# Patient Record
Sex: MALE | Race: OTHER | NOT HISPANIC OR LATINO | Employment: UNEMPLOYED | URBAN - METROPOLITAN AREA
[De-identification: names, ages, dates, MRNs, and addresses within clinical notes are randomized per-mention and may not be internally consistent; named-entity substitution may affect disease eponyms.]

---

## 2024-05-27 DIAGNOSIS — M54.2 NECK PAIN: ICD-10-CM

## 2024-05-27 DIAGNOSIS — M47.22 CERVICAL SPONDYLOSIS WITH RADICULOPATHY: ICD-10-CM

## 2024-05-27 DIAGNOSIS — M54.12 CERVICAL RADICULOPATHY: Primary | ICD-10-CM

## 2024-05-28 ENCOUNTER — CLINICAL SUPPORT (OUTPATIENT)
Dept: REHABILITATION | Facility: HOSPITAL | Age: 49
End: 2024-05-28
Payer: MEDICAID

## 2024-05-28 DIAGNOSIS — Z74.09 IMPAIRED FUNCTIONAL MOBILITY AND ACTIVITY TOLERANCE: Primary | ICD-10-CM

## 2024-05-28 DIAGNOSIS — M54.12 CERVICAL RADICULOPATHY: ICD-10-CM

## 2024-05-28 PROCEDURE — 97162 PT EVAL MOD COMPLEX 30 MIN: CPT

## 2024-05-28 PROCEDURE — 97110 THERAPEUTIC EXERCISES: CPT

## 2024-05-28 NOTE — PROGRESS NOTES
[facility]   Physical Therapy Initial Evaluation     Date: 5/28/2024   Name: Toro Quijano Jr.  Clinic Number: [id number]    Therapy Diagnosis:   Encounter Diagnoses   Name Primary?    Cervical radiculopathy     Impaired functional mobility and activity tolerance Yes     Physician: Mary Kate Leblanc FNP    Physician Orders: PT Eval and Treat  Medical Diagnosis from Referral: Cervical Radiculopathy, Cervical Spondylosis with Radiculopathy, Degenerative Disc Disease, Neck Pain  Evaluation Date: 5/28/2024  Authorization Period Expiration: determined by insurance  Plan of Care Expiration: 8/20/2024  Visit # / Visits authorized: determined by insurance    Time In: 0851  Time Out: 0956  Total Appointment Time (timed & untimed codes): 30 minutes  Total Treatment time (time-based codes) separate from Evaluation: 35 minutes    Surgery: none  Orthopedic Precautions: none  Pertinent History: see medical chart    SUBJECTIVE   Toro reports: neck and bilateral arm pain for years that has recently began to worsen. States he was told that he needed neck surgery years ago and but has not wanted to do it. States that his pain is constant mostly right neck but is on both sides. He describes his pain as aching, dull that can be sharp at times if does the wrong thing. There is radicular pain that is also mostly right but there is left involvement as well. He sleeps ok but does wake up from the pain getting about 5 hours sleep per night. He has to sleep on his left side and back, right side makes him hurt. He is limited in activities as the more he does the more he will hurt. States that his right arm is weak and that he is not able to lift much. He is limited with ADL's and functional capacity. He goal is to get rid of the pain so that he can do more and sleep better.      Imaging, EXAM: CT C-spine without Contrast   Degenerative change of the cervical spine with no evidence of acute traumatic injury      DATE: 3/18/2024 8:49 AM     COMPARISON: None     INDICATION: 48 years Male with Cervical radiculopathy,  no red flags     TECHNIQUE: Contiguous axial CT images of the cervical spine were obtained without administration of IV contrast.  All CTs performed at this institution utilize dose modulation and/or weight-based dose reduction when appropriate to reduce radiation dose to the patient as low as reasonably achievable     Count of known CTs or cardiac nuclear scans in the previous 12 months: 1     FINDINGS: There is no fracture visualized.  The vertebral body alignment is maintained. Multilevel degenerative change of the cervical spine is present with disc space obliteration, osteophytosis, facet and uncovertebral arthropathy, as well as multifocal neuroforaminal narrowing. There is kyphotic reversal of the cervical lordosis. The soft tissues are unremarkable. The visualized lung apices are clear. There is mucosal thickening in the ethmoid and sphenoid sinuses        Medical History:   No past medical history on file.    Surgical History:   Toro Quijano Jr.  has no past surgical history on file.    Medications:   Toro currently has no medications in their medication list.    Allergies:   Review of patient's allergies indicates:  Not on File       Outcome Measure:  Therapist reviewed FOTO scores for Toro Quijano on 5/28/2024. FOTO documents entered into [employer] - see Media section.  FOTO filled out by: Patient     Intake: Patient's Physical FS Primary Measure: 37  (goal 50 by visit 13)  Intake: Risk Adjusted Statistical FOTO: 51   Intake: Limitation Score: 52%  Intake: Category: Carrying  Intake: NDI: 58% (higher score =greater disability)    OBJECTIVE       Posture     Rounded shoulders, forward head, mild to mod kyphosis, slight right head tilt      Palpation     Tender to moderate palpatory pressure right cervical paraspinals, rhomboid major and minor, infraspinatus and levator scap; left cervical paraspinals,  spinous process T4-T6     Dermatomes  BUEs intact to light touch       MMT     FLX 5/5 left, 4/5 right  EXT 5/5 reft, 4/5 right  SCAPTION 4+/5 left, 4/5 right  ABD 4-/5 left, 4/5 right  ER 4/5 left, 4/5 right  IR 4/5 left, 5/5 right  ELBOW FLX 5/5 left, 4+/5 right  ELBOW EXT 5/5 left 4/5 right  PRONATION 5/5 left, 5/5 right  SUPINATION 5/5 left, 5/5 right   5/5 left, 4/5 right      Shoulder Clearance     AROM: basically complete to both shoulders with slight limitation on the right when compared to left     Right - Self Walt Test: negative              Neer Impingement: negative, shoulder abduction weakened     Left - Self-Walt negative            Neer Impingement negative         AROM     Cervical Spine  Flexion -25% limited with pulling and pain on the right cervical and upper trap  Extension - 25% limited, produced right posterior cervical and periscapular pain  Lateral Flexion left - 75% limited pain on the right  Lateral Flexion right - 60% limited pain on the left  Rotation left -45% limited pain on left  Rotation right -60% limited pain on right      **positive numbness and tingling produced during testing      Passive Accessory     Central PAs - mild pain cervical spine, tenderness T3-T6  Unilateral PAs - pain cervical spine right side;  tenderness T4-T7 right      Special Tests     Flexion-rotation (cervicogenic HA): (negative)left, (negative)right  Repeated Motions:   -Flexion negative but pain in cervical spine   -Extension negative but pain in cervical spine   -Left rotation - limited pain on right   -Right rotation - limited pain on right   -Left lateral flexion - mild right neck pain with numbness or tingling      produced right UE   -Right lateral flexion -mild to moderate right neck pain with numbness and  tingling produced right UE  Distraction: Positive, relieves pain symptoms  Spurling's: positive; numbness or tingling produced right; no numbness or tingling produced  left  Compression: positive; pain with numbness or tingling produced R UE               TREATMENT     Toro received the treatments listed below:       Time Activities   Manual 15 Manual cervical traction, distraction, mobs; thoracic PA's   TherAct     TherEx 20 HEP; chin tucks, levator scap stretch, upper trap stretch, horizontal abd, rows   Gait     Neuro Re-ed     Modalities     E-Stim     Dry Needling     Canalith Repositioning         Home Exercises and Patient Education Provided    Education provided:   - HEP, plan of care, postural instruction     Verbal Home Exercises Provided: yes.  Exercises were reviewed on the handout given and Toro was able to demonstrate them prior to the end of the sessionToro demonstrated good understanding of the education provided.    See EMR under Patient Instructions for exercises provided 5/28/2024.    ASSESSMENT     Toro is a 48 y.o. male referred to outpatient Physical Therapy with a medical diagnosis of  Cercvical Radiculopathy, Cervical Spondylosis with Radiculopathy, Degenerative Disc Disease, Patient presents with pain, weakness, limited cervical mobility radicular pain with numbness and tingling into both upper extremities R>L. Patient will benefit from skilled outpatient Physical Therapy to address the deficits stated above and in the chart below, provide education, and to maximize patient's level of independence.     Patient prognosis is Good.     Plan of care discussed with patient: Yes  Patient's spiritual, cultural and educational needs considered and patient is agreeable to the plan of care and goals as stated below:     Anticipated Barriers for therapy: none    Goals:  Short Term Goals: 6 weeks   Patient will report at least 10% increase in functional capacity from initial QuickDash score to indicate clinically significant functional improvement  Patient will report at least 10 point increase on initial FOTO score to indicate clinically significant functional  improvement  Patient will demonstrate independence with home exercise program  Patient will report decreased pain levels by 20%  Patient will improved ability to perform all ADL's at home and report 3/10 pain or less  Patient will demonstrate improved posture with return to sleeping in bed        Long Term Goals: 12 weeks   Patient will report at least 20% increase in functional capacity from initial QuickDash score to indicate clinically significant functional improvement  Patient will report at least 20 point increase on initial FOTO score to indicate clinically significant functional improvement  Patient will report at least 60% overall perceived improvements since start of care.  Patient will demonstrate safe functional activity tolerance and return to PLOF      PLAN   Plan of care Certification: 5/28/2024 to 8/20/2024.    Outpatient Physical Therapy 3 times weekly for 12 weeks to include the following interventions: Cervical/Lumbar Traction, Manual Therapy, Dry Needling, Moist Heat/ Ice, Neuromuscular Re-ed, Patient Education, Self Care, Therapeutic Activities, and Therapeutic Exercise.     Jeet Ying, PT

## 2024-05-30 NOTE — PLAN OF CARE
[facility]   Physical Therapy Initial Evaluation     Date: 5/28/2024   Name: Toro Quijano Jr.  Clinic Number: [id number]    Therapy Diagnosis:   Encounter Diagnoses   Name Primary?    Cervical radiculopathy     Impaired functional mobility and activity tolerance Yes     Physician: Mary Kate Leblanc FNP    Physician Orders: PT Eval and Treat  Medical Diagnosis from Referral: Cervical Radiculopathy, Cervical Spondylosis with Radiculopathy, Degenerative Disc Disease, Neck Pain  Evaluation Date: 5/28/2024  Authorization Period Expiration: determined by insurance  Plan of Care Expiration: 8/20/2024  Visit # / Visits authorized: determined by insurance    Time In: 0851  Time Out: 0956  Total Appointment Time (timed & untimed codes): 30 minutes  Total Treatment time (time-based codes) separate from Evaluation: 35 minutes    Surgery: none  Orthopedic Precautions: none  Pertinent History: see medical chart    SUBJECTIVE   Toro reports: neck and bilateral arm pain for years that has recently began to worsen. States he was told that he needed neck surgery years ago and but has not wanted to do it. States that his pain is constant mostly right neck but is on both sides. He describes his pain as aching, dull that can be sharp at times if does the wrong thing. There is radicular pain that is also mostly right but there is left involvement as well. He sleeps ok but does wake up from the pain getting about 5 hours sleep per night. He has to sleep on his left side and back, right side makes him hurt. He is limited in activities as the more he does the more he will hurt. States that his right arm is weak and that he is not able to lift much. He is limited with ADL's and functional capacity. He goal is to get rid of the pain so that he can do more and sleep better.      Imaging, EXAM: CT C-spine without Contrast   Degenerative change of the cervical spine with no evidence of acute traumatic injury      DATE: 3/18/2024 8:49 AM     COMPARISON: None     INDICATION: 48 years Male with Cervical radiculopathy,  no red flags     TECHNIQUE: Contiguous axial CT images of the cervical spine were obtained without administration of IV contrast.  All CTs performed at this institution utilize dose modulation and/or weight-based dose reduction when appropriate to reduce radiation dose to the patient as low as reasonably achievable     Count of known CTs or cardiac nuclear scans in the previous 12 months: 1     FINDINGS: There is no fracture visualized.  The vertebral body alignment is maintained. Multilevel degenerative change of the cervical spine is present with disc space obliteration, osteophytosis, facet and uncovertebral arthropathy, as well as multifocal neuroforaminal narrowing. There is kyphotic reversal of the cervical lordosis. The soft tissues are unremarkable. The visualized lung apices are clear. There is mucosal thickening in the ethmoid and sphenoid sinuses        Medical History:   No past medical history on file.    Surgical History:   Toro Quijano Jr.  has no past surgical history on file.    Medications:   Toro currently has no medications in their medication list.    Allergies:   Review of patient's allergies indicates:  Not on File       Outcome Measure:  Therapist reviewed FOTO scores for Toro Quijano on 5/28/2024. FOTO documents entered into [employer] - see Media section.  FOTO filled out by: Patient     Intake: Patient's Physical FS Primary Measure: 37  (goal 50 by visit 13)  Intake: Risk Adjusted Statistical FOTO: 51   Intake: Limitation Score: 52%  Intake: Category: Carrying  Intake: NDI: 58% (higher score =greater disability)    OBJECTIVE       Posture     Rounded shoulders, forward head, mild to mod kyphosis, slight right head tilt      Palpation     Tender to moderate palpatory pressure right cervical paraspinals, rhomboid major and minor, infraspinatus and levator scap; left cervical paraspinals;  spinous process T4-T6 tender     Dermatomes  BUEs intact to light touch       MMT     FLX 5/5 left, 4/5 right  EXT 5/5 reft, 4/5 right  SCAPTION 4+/5 left, 4/5 right  ABD 4-/5 left, 4/5 right  ER 4/5 left, 4/5 right  IR 4/5 left, 5/5 right  ELBOW FLX 5/5 left, 4+/5 right  ELBOW EXT 5/5 left 4/5 right  PRONATION 5/5 left, 5/5 right  SUPINATION 5/5 left, 5/5 right   5/5 left, 4/5 right      Shoulder Clearance     AROM: basically complete to both shoulders with slight limitation on the right when compared to left     Right - Self Walt Test: negative              Neer Impingement: negative, shoulder abduction weakened     Left - Self-Walt negative            Neer Impingement negative         AROM     Cervical Spine  Flexion -25% limited with pulling and pain on the right cervical and upper trap  Extension - 25% limited, produced right posterior cervical and periscapular pain  Lateral Flexion left - 75% limited pain on the right  Lateral Flexion right - 60% limited pain on the left  Rotation left -45% limited pain on left  Rotation right -60% limited pain on right      **positive numbness and tingling produced during testing      Passive Accessory     Central PAs - mild pain cervical spine, tenderness T3-T6  Unilateral PAs - pain cervical spine right side;  tenderness T4-T7 right      Special Tests     Flexion-rotation (cervicogenic HA): (negative)left, (negative)right  Repeated Motions:   -Flexion negative but pain in cervical spine   -Extension negative but pain in cervical spine   -Left rotation - limited pain on right   -Right rotation - limited pain on right   -Left lateral flexion - mild right neck pain with numbness or tingling      produced right UE   -Right lateral flexion -mild to moderate right neck pain with numbness and  tingling produced right UE  Distraction: Positive, relieves pain symptoms  Spurling's: positive; numbness or tingling produced right; no numbness or tingling produced  left  Compression: positive; pain with numbness or tingling produced R UE               TREATMENT     Toro received the treatments listed below:       Time Activities   Manual 15 Manual cervical traction, distraction, mobs; thoracic PA's   TherAct     TherEx 20 HEP; chin tucks, levator scap stretch, upper trap stretch, horizontal abd, rows   Gait     Neuro Re-ed     Modalities     E-Stim     Dry Needling     Canalith Repositioning         Home Exercises and Patient Education Provided    Education provided:   - HEP, plan of care, postural instruction     Verbal Home Exercises Provided: yes.  Exercises were reviewed on the handout given and Toro was able to demonstrate them prior to the end of the sessionToro demonstrated good understanding of the education provided.    See EMR under Patient Instructions for exercises provided 5/28/2024.    ASSESSMENT     Toro is a 48 y.o. male referred to outpatient Physical Therapy with a medical diagnosis of  Cercvical Radiculopathy, Cervical Spondylosis with Radiculopathy, Degenerative Disc Disease, Patient presents with pain, weakness, limited cervical mobility radicular pain with numbness and tingling into both upper extremities R>L. Patient will benefit from skilled outpatient Physical Therapy to address the deficits stated above and in the chart below, provide education, and to maximize patient's level of independence.     Patient prognosis is Good.     Plan of care discussed with patient: Yes  Patient's spiritual, cultural and educational needs considered and patient is agreeable to the plan of care and goals as stated below:     Anticipated Barriers for therapy: none    Goals:  Short Term Goals: 6 weeks   Patient will report at least 10% increase in functional capacity from initial QuickDash score to indicate clinically significant functional improvement  Patient will report at least 10 point increase on initial FOTO score to indicate clinically significant functional  improvement  Patient will demonstrate independence with home exercise program  Patient will report decreased pain levels by 20%  Patient will improved ability to perform all ADL's at home and report 3/10 pain or less  Patient will demonstrate improved posture with return to sleeping in bed        Long Term Goals: 12 weeks   Patient will report at least 20% increase in functional capacity from initial QuickDash score to indicate clinically significant functional improvement  Patient will report at least 20 point increase on initial FOTO score to indicate clinically significant functional improvement  Patient will report at least 60% overall perceived improvements since start of care.  Patient will demonstrate safe functional activity tolerance and return to PLOF      PLAN   Plan of care Certification: 5/28/2024 to 8/20/2024.    Outpatient Physical Therapy 3 times weekly for 12 weeks to include the following interventions: Cervical/Lumbar Traction, Manual Therapy, Dry Needling, Moist Heat/ Ice, Neuromuscular Re-ed, Patient Education, Self Care, Therapeutic Activities, and Therapeutic Exercise.     Jeet Ying, PT

## 2024-05-31 ENCOUNTER — CLINICAL SUPPORT (OUTPATIENT)
Dept: REHABILITATION | Facility: HOSPITAL | Age: 49
End: 2024-05-31
Payer: MEDICAID

## 2024-05-31 DIAGNOSIS — Z74.09 IMPAIRED FUNCTIONAL MOBILITY AND ACTIVITY TOLERANCE: ICD-10-CM

## 2024-05-31 DIAGNOSIS — M54.12 CERVICAL RADICULOPATHY: Primary | ICD-10-CM

## 2024-05-31 PROCEDURE — 97012 MECHANICAL TRACTION THERAPY: CPT

## 2024-05-31 PROCEDURE — 97140 MANUAL THERAPY 1/> REGIONS: CPT

## 2024-05-31 PROCEDURE — 97110 THERAPEUTIC EXERCISES: CPT

## 2024-05-31 NOTE — PROGRESS NOTES
Physical Therapy Treatment Note     Name: Toro Quijano Jr.  Clinic Number: [id number]    Therapy Diagnosis: No diagnosis found.  Physician: Mary Kate Leblanc FNP    Visit Date: 5/31/2024    Physician Orders: PT Eval and Treat  Medical Diagnosis from Referral: Cervical Radiculopathy, Cervical Spondylosis with Radiculopathy, Degenerative Disc Disease, Neck Pain  Evaluation Date: 5/28/2024  Authorization Period Expiration: determined by insurance  Plan of Care Expiration: 8/20/2024  Visit #/Visits authorized: visit #1    Time In: 0747  Time Out: 0900  Total Billable Time: 63 minutes    Surgery: none  Orthopedic Precautions: none  Pertinent History: see medical chart    Subjective     Patient reports: that he is having some neck pain with mild arm pain. Mostly on the R side. Does have a small HA.    Response to previous treatment: good    Therapist reviewed FOTO scores for Toro Quijano on 5/28/2024. FOTO documents entered into [employer] - see Media section.  FOTO filled out by: Patient     Intake: Patient's Physical FS Primary Measure: 37  (goal 50 by visit 13)  Intake: Risk Adjusted Statistical FOTO: 51   Intake: Limitation Score: 52%  Intake: Category: Carrying  Intake: NDI: 58% (higher score =greater disability)    Objective     Toro received the following treatment:     Time Activities   Manual 23 passive stretch levator, upper traps, posterior scalenes, suboccipital release, lateral cervical glides, right gapping mobilizations, passive cervical rotation end range with stretch, Biofreeze to posterior neck and upper traps   TherAct     TherEx 20 isometric cervical, chin tucks, levator scap stretch, upper trap stretch, banded horizontal abd, banded bilat ER   Gait     Neuro Re-ed     Modalities 10 CP post exercise   E-Stim     Dry Needling     Mechanical cervical traction 20 30/15#; 30/10 sec cycle, 6:6 ramp         Home Exercises Provided and Patient Education Provided     - HEP, plan of care, postural instruction      Verbal Home Exercises Provided: yes.  Exercises were reviewed on the handout given and Toro was able to demonstrate them prior to the end of the sessionToro demonstrated good understanding of the education provided.     See EMR under Patient Instructions for exercises provided 5/28/2024.    Assessment     Toro is a 48 y.o. male referred to outpatient Physical Therapy with a medical diagnosis of Cercvical Radiculopathy, Cervical Spondylosis with Radiculopathy, Degenerative Disc Disease, Patient presents with pain, weakness, limited cervical mobility radicular pain with numbness and tingling into both upper extremities R>L.     Toro received mechanical cervical traction and he did well for about 15 minutes but then began to have a HA and it was discontinued. He was receiving relief initially. We may decrease the length of time and see if helpful, if not will discontinue. Had him perform both cervical and scapula stabilization exercises and he tolerated it ok. Did some gentle manual stretching and everything to the right seem to increase his discomfort. We will continue with cervical spine intervention to allow stability and minimize radicular symptoms.    Patient prognosis is Good.      Anticipated barriers to physical therapy: none    Goals: Toro Is progressing well towards his goals.    Short Term Goals: 6 weeks   Patient will report at least 10% increase in functional capacity from initial QuickDash score to indicate clinically significant functional improvement  Patient will report at least 10 point increase on initial FOTO score to indicate clinically significant functional improvement  Patient will demonstrate independence with home exercise program  Patient will report decreased pain levels by 20%  Patient will improved ability to perform all ADL's at home and report 3/10 pain or less  Patient will demonstrate improved posture with return to sleeping in bed      Long Term Goals: 12 weeks   Patient will  report at least 20% increase in functional capacity from initial QuickDash score to indicate clinically significant functional improvement  Patient will report at least 20 point increase on initial FOTO score to indicate clinically significant functional improvement  Patient will report at least 60% overall perceived improvements since start of care.  Patient will demonstrate safe functional activity tolerance and return to PLOF    Plan     Outpatient Physical Therapy 3 times weekly for 12 weeks to include the following interventions: Cervical/Lumbar Traction, Manual Therapy, Dry Needling, Moist Heat/ Ice, Neuromuscular Re-ed, Patient Education, Self Care, Therapeutic Activities, and Therapeutic Exercise.     Jete Ying, PT ;ly

## 2024-06-04 ENCOUNTER — CLINICAL SUPPORT (OUTPATIENT)
Dept: REHABILITATION | Facility: HOSPITAL | Age: 49
End: 2024-06-04
Payer: MEDICAID

## 2024-06-04 DIAGNOSIS — M54.12 CERVICAL RADICULOPATHY: Primary | ICD-10-CM

## 2024-06-04 DIAGNOSIS — Z74.09 IMPAIRED FUNCTIONAL MOBILITY AND ACTIVITY TOLERANCE: ICD-10-CM

## 2024-06-04 PROCEDURE — 97140 MANUAL THERAPY 1/> REGIONS: CPT

## 2024-06-04 PROCEDURE — 97012 MECHANICAL TRACTION THERAPY: CPT

## 2024-06-04 PROCEDURE — 97110 THERAPEUTIC EXERCISES: CPT

## 2024-06-04 NOTE — PROGRESS NOTES
Physical Therapy Treatment Note     Name: Toro Quijano  Clinic Number: [id number]    Therapy Diagnosis: No diagnosis found.  Physician: Mary Kate Leblanc FNP    Visit Date: 6/4/2024    Physician Orders: PT Eval and Treat  Medical Diagnosis from Referral: Cervical Radiculopathy, Cervical Spondylosis with Radiculopathy, Degenerative Disc Disease, Neck Pain  Evaluation Date: 5/28/2024  Authorization Period Expiration: determined by insurance  Plan of Care Expiration: 8/20/2024  Visit #/Visits authorized: visit #2    Time In: 0916  Time Out:1015   Total Billable Time: 59 minutes    Surgery: none  Orthopedic Precautions: none  Pertinent History: see medical chart    Subjective     Patient reports: that he is having some neck pain with mild arm pain. Mostly on the R side. Does have a small HA.    Response to previous treatment: good    Therapist reviewed FOTO scores for Toro Quijano on 5/28/2024. FOTO documents entered into EPIC - see Media section.  FOTO filled out by: Patient     Intake: Patient's Physical FS Primary Measure: 37  (goal 50 by visit 13)  Intake: Risk Adjusted Statistical FOTO: 51   Intake: Limitation Score: 52%  Intake: Category: Carrying  Intake: NDI: 58% (higher score =greater disability)    Objective     Toro received the following treatment:     Time Activities   Manual 23 passive stretch levator, upper traps, posterior scalenes, suboccipital release, lateral cervical glides, right gapping mobilizations, passive cervical rotation end range with stretch, Biofreeze to posterior neck and upper traps   TherAct     TherEx 20 isometric cervical, chin tucks, levator scap stretch, upper trap stretch, banded horizontal abd, banded bilat ER   Gait     Neuro Re-ed     Modalities 10 CP post exercise   E-Stim     Dry Needling     Mechanical cervical traction 12 25/15#; 30/10 sec cycle, 6:6 ramp         Home Exercises Provided and Patient Education Provided     - HEP, plan of care, postural instruction      Verbal Home Exercises Provided: yes.  Exercises were reviewed on the handout given and Toro was able to demonstrate them prior to the end of the sessionToro demonstrated good understanding of the education provided.     See EMR under Patient Instructions for exercises provided 5/28/2024.    Assessment     Toro is a 48 y.o. male referred to outpatient Physical Therapy with a medical diagnosis of Cercvical Radiculopathy, Cervical Spondylosis with Radiculopathy, Degenerative Disc Disease, Patient presents with pain, weakness, limited cervical mobility radicular pain with numbness and tingling into both upper extremities R>L.     Toro received mechanical cervical traction and he did well for about 12 minutes but then began to have a HA. He was able to last a little longer than last time but still caused the HA so we will plan to discontinue. Had him perform both cervical and scapula stabilization exercises and he tolerated it ok. Did some gentle manual stretching and everything to the right seem to increase his discomfort but had better tolerance today. We will continue with cervical spine intervention to allow stability and minimize radicular symptoms.    Patient prognosis is Good.      Anticipated barriers to physical therapy: none    Goals: Toro Is progressing well towards his goals.    Short Term Goals: 6 weeks   Patient will report at least 10% increase in functional capacity from initial QuickDash score to indicate clinically significant functional improvement  Patient will report at least 10 point increase on initial FOTO score to indicate clinically significant functional improvement  Patient will demonstrate independence with home exercise program  Patient will report decreased pain levels by 20%  Patient will improved ability to perform all ADL's at home and report 3/10 pain or less  Patient will demonstrate improved posture with return to sleeping in bed      Long Term Goals: 12 weeks   Patient will  report at least 20% increase in functional capacity from initial QuickDash score to indicate clinically significant functional improvement  Patient will report at least 20 point increase on initial FOTO score to indicate clinically significant functional improvement  Patient will report at least 60% overall perceived improvements since start of care.  Patient will demonstrate safe functional activity tolerance and return to PLOF    Plan     Outpatient Physical Therapy 3 times weekly for 12 weeks to include the following interventions: Cervical/Lumbar Traction, Manual Therapy, Dry Needling, Moist Heat/ Ice, Neuromuscular Re-ed, Patient Education, Self Care, Therapeutic Activities, and Therapeutic Exercise.     Jeet Ying, PT ;ly

## 2024-06-07 ENCOUNTER — CLINICAL SUPPORT (OUTPATIENT)
Dept: REHABILITATION | Facility: HOSPITAL | Age: 49
End: 2024-06-07
Payer: MEDICAID

## 2024-06-07 DIAGNOSIS — Z74.09 IMPAIRED FUNCTIONAL MOBILITY AND ACTIVITY TOLERANCE: ICD-10-CM

## 2024-06-07 DIAGNOSIS — M54.12 CERVICAL RADICULOPATHY: Primary | ICD-10-CM

## 2024-06-07 PROCEDURE — 97140 MANUAL THERAPY 1/> REGIONS: CPT

## 2024-06-07 PROCEDURE — 97110 THERAPEUTIC EXERCISES: CPT

## 2024-06-07 NOTE — PROGRESS NOTES
Physical Therapy Treatment Note     Name: Toro Quijano  Clinic Number: [id number]    Therapy Diagnosis: No diagnosis found.  Physician: Mary Kate Leblanc FNP    Visit Date: 6/7/2024    Physician Orders: PT Eval and Treat  Medical Diagnosis from Referral: Cervical Radiculopathy, Cervical Spondylosis with Radiculopathy, Degenerative Disc Disease, Neck Pain  Evaluation Date: 5/28/2024  Authorization Period Expiration: determined by insurance  Plan of Care Expiration: 8/20/2024  Visit #/Visits authorized: visit #3    Time In: 0732  Time Out: 0911   Total Billable Time: 59 minutes    Surgery: none  Orthopedic Precautions: none  Pertinent History: see medical chart    Subjective     Patient reports: Toro reports that he is doing a little better today. EDMONDSON from last session went away fairly quickly at home after the session. Mostly on the R side neck pain today.    Response to previous treatment: good    Pain: 5/10  Location: R neck    Therapist reviewed FOTO scores for Toro Quijano on 5/28/2024. FOTO documents entered into EPIC - see Media section.  FOTO filled out by: Patient     Intake: Patient's Physical FS Primary Measure: 37  (goal 50 by visit 13)  Intake: Risk Adjusted Statistical FOTO: 51   Intake: Limitation Score: 52%  Intake: Category: Carrying  Intake: NDI: 58% (higher score =greater disability)    Objective     Toro received the following treatment:     Time Activities   Manual 25 passive stretch levator, upper traps, posterior scalenes, suboccipital release, lateral cervical glides, right gapping mobilizations, passive cervical rotation end range with stretch, Biofreeze to posterior neck and upper traps   TherAct     TherEx 34 isometric cervical, chin tucks, levator scap stretch, upper trap stretch, banded horizontal abd, banded bilat ER, face pulls, banded shld ext, head lifts side lying and prone, prone W's, open book side lying   Gait     Neuro Re-ed     Modalities 10/10 MH to neck pre exercise/CP  post exercise   E-Stim     Dry Needling     Mechanical cervical traction Not done today 25/15#; 30/10 sec cycle, 6:6 ramp         Home Exercises Provided and Patient Education Provided     - HEP, plan of care, postural instruction     Verbal Home Exercises Provided: yes.  Exercises were reviewed on the handout given and Toro was able to demonstrate them prior to the end of the sessionToro demonstrated good understanding of the education provided.     See EMR under Patient Instructions for exercises provided 5/28/2024.    Assessment     Toro is a 48 y.o. male referred to outpatient Physical Therapy with a medical diagnosis of Cercvical Radiculopathy, Cervical Spondylosis with Radiculopathy, Degenerative Disc Disease, Patient presents with pain, weakness, limited cervical mobility radicular pain with numbness and tingling into both upper extremities R>L.     Held off on mechanical cervical traction today and did manual instead and he tolerated it much better. Had him perform both cervical and scapula stabilization exercises and he tolerated it ok. Did some gentle manual stretching and everything to the right seem to increase his discomfort but had better tolerance today. Had no radicular symptoms today. We will continue with cervical spine intervention to allow stability and minimize radicular symptoms.    Patient prognosis is Good.      Anticipated barriers to physical therapy: none    Goals: Toro Is progressing well towards his goals.    Short Term Goals: 6 weeks   Patient will report at least 10% increase in functional capacity from initial QuickDash score to indicate clinically significant functional improvement  Patient will report at least 10 point increase on initial FOTO score to indicate clinically significant functional improvement  Patient will demonstrate independence with home exercise program  Patient will report decreased pain levels by 20%  Patient will improved ability to perform all ADL's at home  and report 3/10 pain or less  Patient will demonstrate improved posture with return to sleeping in bed      Long Term Goals: 12 weeks   Patient will report at least 20% increase in functional capacity from initial QuickDash score to indicate clinically significant functional improvement  Patient will report at least 20 point increase on initial FOTO score to indicate clinically significant functional improvement  Patient will report at least 60% overall perceived improvements since start of care.  Patient will demonstrate safe functional activity tolerance and return to PLOF    Plan     Outpatient Physical Therapy 3 times weekly for 12 weeks to include the following interventions: Cervical/Lumbar Traction, Manual Therapy, Dry Needling, Moist Heat/ Ice, Neuromuscular Re-ed, Patient Education, Self Care, Therapeutic Activities, and Therapeutic Exercise.     Jeet Ying, PT ;ly

## 2024-06-11 ENCOUNTER — CLINICAL SUPPORT (OUTPATIENT)
Dept: REHABILITATION | Facility: HOSPITAL | Age: 49
End: 2024-06-11
Payer: MEDICAID

## 2024-06-11 DIAGNOSIS — Z74.09 IMPAIRED FUNCTIONAL MOBILITY AND ACTIVITY TOLERANCE: ICD-10-CM

## 2024-06-11 DIAGNOSIS — M47.22 CERVICAL SPONDYLOSIS WITH RADICULOPATHY: ICD-10-CM

## 2024-06-11 DIAGNOSIS — M54.12 CERVICAL RADICULOPATHY: Primary | ICD-10-CM

## 2024-06-11 PROCEDURE — 97110 THERAPEUTIC EXERCISES: CPT | Mod: KX

## 2024-06-11 PROCEDURE — 97140 MANUAL THERAPY 1/> REGIONS: CPT | Mod: KX

## 2024-06-11 NOTE — PROGRESS NOTES
Physical Therapy Treatment Note     Name: Toro Quijano  Clinic Number: [id number]    Therapy Diagnosis: No diagnosis found.  Physician: Mary Kate Leblanc FNP    Visit Date: 6/11/2024    Physician Orders: PT Eval and Treat  Medical Diagnosis from Referral: Cervical Radiculopathy, Cervical Spondylosis with Radiculopathy, Degenerative Disc Disease, Neck Pain  Evaluation Date: 5/28/2024  Authorization Period Expiration: determined by insurance  Plan of Care Expiration: 8/20/2024  Visit #/Visits authorized: visit #4    Time In: 0918  Time Out: 1032  Total Billable Time: 54 minutes    Surgery: none  Orthopedic Precautions: none  Pertinent History: see medical chart    Subjective     Patient reports: Toro reports that he is hurting today. Had a rough weekend. Doing a little better today. Continues with mostly R neck and upper trap pain.    Response to previous treatment: good    Pain: 8/10  Location: R neck    Therapist reviewed FOTO scores for Toro Quijano on 5/28/2024. FOTO documents entered into EPIC - see Media section.  FOTO filled out by: Patient     Intake: Patient's Physical FS Primary Measure: 37  (goal 50 by visit 13)  Intake: Risk Adjusted Statistical FOTO: 51   Intake: Limitation Score: 52%  Intake: Category: Carrying  Intake: NDI: 58% (higher score =greater disability)    Objective     Toro received the following treatment:     Time Activities   Manual 25 passive stretch levator, upper traps, posterior scalenes, suboccipital release, lateral cervical glides, right gapping mobilizations, passive cervical rotation end range with stretch, Biofreeze to posterior neck and upper traps   TherAct     TherEx 34 isometric cervical, chin tucks, levator scap stretch, upper trap stretch, banded horizontal abd, banded bilat ER, face pulls, banded shld ext, head lifts side lying and prone, prone W's, open book side lying   Gait     Neuro Re-ed     Modalities 10/10 MH to neck pre exercise/CP post exercise   E-Stim      Dry Needling     Mechanical cervical traction Not done today 25/15#; 30/10 sec cycle, 6:6 ramp         Home Exercises Provided and Patient Education Provided     - HEP, plan of care, postural instruction     Verbal Home Exercises Provided: yes.  Exercises were reviewed on the handout given and Toro was able to demonstrate them prior to the end of the sessionToro demonstrated good understanding of the education provided.     See EMR under Patient Instructions for exercises provided 5/28/2024.    Assessment     Toro is a 48 y.o. male referred to outpatient Physical Therapy with a medical diagnosis of Cercvical Radiculopathy, Cervical Spondylosis with Radiculopathy, Degenerative Disc Disease, Patient presents with pain, weakness, limited cervical mobility radicular pain with numbness and tingling into both upper extremities R>L.     Held off on mechanical cervical traction today and did manual instead and he tolerated it much better. Had him perform both cervical and scapula stabilization exercises and he tolerated it ok. Did some gentle manual stretching and everything to the right seem to increase his discomfort but had better tolerance today. Had no radicular symptoms today and he felt much better after treatment session. We will continue with cervical spine intervention to allow stability and minimize radicular symptoms.    Patient prognosis is Good.      Anticipated barriers to physical therapy: none    Goals: Toro Is progressing well towards his goals.    Short Term Goals: 6 weeks   Patient will report at least 10% increase in functional capacity from initial QuickDash score to indicate clinically significant functional improvement  Patient will report at least 10 point increase on initial FOTO score to indicate clinically significant functional improvement  Patient will demonstrate independence with home exercise program  Patient will report decreased pain levels by 20%  Patient will improved ability to  perform all ADL's at home and report 3/10 pain or less  Patient will demonstrate improved posture with return to sleeping in bed      Long Term Goals: 12 weeks   Patient will report at least 20% increase in functional capacity from initial QuickDash score to indicate clinically significant functional improvement  Patient will report at least 20 point increase on initial FOTO score to indicate clinically significant functional improvement  Patient will report at least 60% overall perceived improvements since start of care.  Patient will demonstrate safe functional activity tolerance and return to PLOF    Plan     Outpatient Physical Therapy 3 times weekly for 12 weeks to include the following interventions: Cervical/Lumbar Traction, Manual Therapy, Dry Needling, Moist Heat/ Ice, Neuromuscular Re-ed, Patient Education, Self Care, Therapeutic Activities, and Therapeutic Exercise.     Jeet Ying, PT ;ly

## 2024-06-14 ENCOUNTER — CLINICAL SUPPORT (OUTPATIENT)
Dept: REHABILITATION | Facility: HOSPITAL | Age: 49
End: 2024-06-14
Payer: MEDICAID

## 2024-06-14 DIAGNOSIS — Z74.09 IMPAIRED FUNCTIONAL MOBILITY AND ACTIVITY TOLERANCE: ICD-10-CM

## 2024-06-14 DIAGNOSIS — M54.12 CERVICAL RADICULOPATHY: Primary | ICD-10-CM

## 2024-06-14 PROCEDURE — 97110 THERAPEUTIC EXERCISES: CPT

## 2024-06-14 PROCEDURE — 97140 MANUAL THERAPY 1/> REGIONS: CPT

## 2024-06-14 NOTE — PROGRESS NOTES
Physical Therapy Treatment Note     Name: Toro Quijano  Clinic Number: [id number]    Therapy Diagnosis: No diagnosis found.  Physician: Mary Kate Leblanc FNP    Visit Date: 6/14/2024    Physician Orders: PT Eval and Treat  Medical Diagnosis from Referral: Cervical Radiculopathy, Cervical Spondylosis with Radiculopathy, Degenerative Disc Disease, Neck Pain  Evaluation Date: 5/28/2024  Authorization Period Expiration: determined by insurance  Plan of Care Expiration: 8/20/2024  Visit #/Visits authorized: visit #5    Time In: 0748  Time Out: 0905  Total Billable Time: 57 minutes    Surgery: none  Orthopedic Precautions: none  Pertinent History: see medical chart    Subjective     Patient reports: Toro reports that he is hurting and has been hurting. Woke up this morning with more pain. Continues with mostly R neck and upper trap pain.    Response to previous treatment: good    Pain: 8/10  Location: R neck    Therapist reviewed FOTO scores for Toro Quijano on 5/28/2024. FOTO documents entered into EPIC - see Media section.  FOTO filled out by: Patient     Intake: Patient's Physical FS Primary Measure: 37  (goal 50 by visit 13)  Intake: Risk Adjusted Statistical FOTO: 51   Intake: Limitation Score: 52%  Intake: Category: Carrying  Intake: NDI: 58% (higher score =greater disability)    Objective     Toro received the following treatment:     Time Activities   Manual 25 passive stretch levator, upper traps, posterior scalenes, suboccipital release, lateral cervical glides, right gapping mobilizations, passive cervical rotation end range with stretch, Biofreeze to posterior neck and upper traps   TherAct     TherEx 32 isometric cervical, chin tucks, levator scap stretch, upper trap stretch, banded horizontal abd, banded bilat ER, face pulls, banded shld ext, head lifts side lying and prone, prone W's, open book side lying   Gait     Neuro Re-ed     Modalities 10/10 MH to neck pre exercise/CP post exercise   E-Stim      Dry Needling     Mechanical cervical traction Not done today 25/15#; 30/10 sec cycle, 6:6 ramp         Home Exercises Provided and Patient Education Provided     - HEP, plan of care, postural instruction     Verbal Home Exercises Provided: yes.  Exercises were reviewed on the handout given and Toro was able to demonstrate them prior to the end of the sessionToro demonstrated good understanding of the education provided.     See EMR under Patient Instructions for exercises provided 5/28/2024.    Assessment     Toro is a 48 y.o. male referred to outpatient Physical Therapy with a medical diagnosis of Cercvical Radiculopathy, Cervical Spondylosis with Radiculopathy, Degenerative Disc Disease, Patient presents with pain, weakness, limited cervical mobility radicular pain with numbness and tingling into both upper extremities R>L.     Continued with manual cervical traction and he tolerated it pretty well without complaints. Continued with cervical and scapula stabilization exercises and he tolerated it fairly well. Did some gentle manual stretching and everything to the right seem to increase his discomfort but had better tolerance today. Had no radicular symptoms today and he felt much better after treatment session. We will continue with cervical spine intervention to allow stability and minimize radicular symptoms.    Patient prognosis is Good.      Anticipated barriers to physical therapy: none    Goals: Toro Is progressing well towards his goals.    Short Term Goals: 6 weeks   Patient will report at least 10% increase in functional capacity from initial QuickDash score to indicate clinically significant functional improvement  Patient will report at least 10 point increase on initial FOTO score to indicate clinically significant functional improvement  Patient will demonstrate independence with home exercise program  Patient will report decreased pain levels by 20%  Patient will improved ability to perform  all ADL's at home and report 3/10 pain or less  Patient will demonstrate improved posture with return to sleeping in bed      Long Term Goals: 12 weeks   Patient will report at least 20% increase in functional capacity from initial QuickDash score to indicate clinically significant functional improvement  Patient will report at least 20 point increase on initial FOTO score to indicate clinically significant functional improvement  Patient will report at least 60% overall perceived improvements since start of care.  Patient will demonstrate safe functional activity tolerance and return to PLOF    Plan     Outpatient Physical Therapy 3 times weekly for 12 weeks to include the following interventions: Cervical/Lumbar Traction, Manual Therapy, Dry Needling, Moist Heat/ Ice, Neuromuscular Re-ed, Patient Education, Self Care, Therapeutic Activities, and Therapeutic Exercise.     Jeet Ying, PT ;ly

## 2024-06-25 ENCOUNTER — CLINICAL SUPPORT (OUTPATIENT)
Dept: REHABILITATION | Facility: HOSPITAL | Age: 49
End: 2024-06-25
Payer: MEDICAID

## 2024-06-25 DIAGNOSIS — Z74.09 IMPAIRED FUNCTIONAL MOBILITY AND ACTIVITY TOLERANCE: ICD-10-CM

## 2024-06-25 DIAGNOSIS — M54.12 CERVICAL RADICULOPATHY: Primary | ICD-10-CM

## 2024-06-25 DIAGNOSIS — M47.22 CERVICAL SPONDYLOSIS WITH RADICULOPATHY: ICD-10-CM

## 2024-06-25 PROCEDURE — 97110 THERAPEUTIC EXERCISES: CPT

## 2024-06-25 PROCEDURE — 97140 MANUAL THERAPY 1/> REGIONS: CPT

## 2024-06-25 NOTE — PROGRESS NOTES
Physical Therapy Discharge Note     Name: Toro Quijano  Clinic Number: [id number]    Therapy Diagnosis: No diagnosis found.  Physician: Mary Kate Leblanc FNP    Visit Date: 6/25/2024    Physician Orders: PT Eval and Treat  Medical Diagnosis from Referral: Cervical Radiculopathy, Cervical Spondylosis with Radiculopathy, Degenerative Disc Disease, Neck Pain  Evaluation Date: 5/28/2024  Authorization Period Expiration: determined by insurance  Plan of Care Expiration: 8/20/2024  Visit #/Visits authorized: visit #6    Time In: 0915  Time Out: 1032  Total Billable Time: 57 minutes    Surgery: none  Orthopedic Precautions: none  Pertinent History: see medical chart    Subjective     Patient reports: Toro hurting today along posterior cervical and inter scapula area. Saw doctor in [place] who wants him to continue with therapy and they will see Again later next month. Not having any arm symptoms today but did over the weekend.    Response to previous treatment: good    Pain: 8/10  Location: R neck    Therapist reviewed FOTO scores for Toro Quijano on 5/28/2024. FOTO documents entered into EPIC - see Media section.  FOTO filled out by: Patient     Intake: Patient's Physical FS Primary Measure: 37  (goal 50 by visit 13)  Intake: Risk Adjusted Statistical FOTO: 51   Intake: Limitation Score: 52%  Intake: Category: Carrying  Intake: NDI: 58% (higher score =greater disability)    6/26/2024: Patient's Physical FS Primary Measure: 34  (goal 50 by visit 13)  Intake: Risk Adjusted Statistical FOTO: 51   6/26/2024: Limitation Score: 66%  6/26/2024: Category: Carrying  6/26/2024: NDI: 64% (higher score =greater disability)    Objective     Toro received the following treatment:     Time Activities   Manual 25 passive stretch levator, upper traps, posterior scalenes, suboccipital release, lateral cervical glides, right gapping mobilizations, passive cervical rotation end range with stretch, Biofreeze to posterior neck and  upper traps   TherAct     TherEx 32 isometric cervical, chin tucks, levator scap stretch, upper trap stretch, banded horizontal abd, banded bilat ER, face pulls, banded shld ext, head lifts side lying and prone, prone W's, open book side lying   Gait     Neuro Re-ed     Modalities 10/10 MH to neck pre exercise/CP post exercise   E-Stim     Dry Needling     Mechanical cervical traction Not done today 25/15#; 30/10 sec cycle, 6:6 ramp         Home Exercises Provided and Patient Education Provided     - HEP, plan of care, postural instruction     Verbal Home Exercises Provided: yes.  Exercises were reviewed on the handout given and Toro was able to demonstrate them prior to the end of the sessionToro demonstrated good understanding of the education provided.     See EMR under Patient Instructions for exercises provided 5/28/2024.    Assessment     Toro is a 48 y.o. male referred to outpatient Physical Therapy with a medical diagnosis of Cercvical Radiculopathy, Cervical Spondylosis with Radiculopathy, Degenerative Disc Disease, Patient presents with pain, weakness, limited cervical mobility radicular pain with numbness and tingling into both upper extremities R>L.     Continued with manual cervical traction and he tolerated it pretty well without complaints. Continued with cervical and scapula stabilization exercises and he tolerated it fairly well. Did some gentle manual stretching and everything to the right seem to increase his discomfort but had better tolerance today. Had no radicular symptoms today and he felt much better after treatment session. We will continue with cervical spine intervention to allow stability and minimize radicular symptoms.    Patient prognosis is Good.      Anticipated barriers to physical therapy: none    Goals: Toro Is progressing well towards his goals.    Short Term Goals: 6 weeks   Patient will report at least 10% increase in functional capacity from initial QuickDash score to  indicate clinically significant functional improvement-Not Met  Patient will report at least 10 point increase on initial FOTO score to indicate clinically significant functional improvement-Not Met  Patient will demonstrate independence with home exercise program-Not Met  Patient will report decreased pain levels by 20%-Not Met  Patient will improved ability to perform all ADL's at home and report 3/10 pain or less-Not Met  Patient will demonstrate improved posture with return to sleeping in bed-Not Met      Long Term Goals: 12 weeks   Patient will report at least 20% increase in functional capacity from initial QuickDash score to indicate clinically significant functional improvement  Patient will report at least 20 point increase on initial FOTO score to indicate clinically significant functional improvement  Patient will report at least 60% overall perceived improvements since start of care.  Patient will demonstrate safe functional activity tolerance and return to PLOF    Plan     Discharge Physical Therapy services with advisement to return to his doctor.    6/27/2024: Patient called reporting that he feels therapy is making his symptoms worse and would like to discharge. He was advised to return to his doctor.     Toro completed 6 visits of physical therapy and goals were not met.    Jeet Ying, PT

## 2024-12-25 ENCOUNTER — HOSPITAL ENCOUNTER (EMERGENCY)
Facility: HOSPITAL | Age: 49
Discharge: HOME | End: 2024-12-25
Attending: EMERGENCY MEDICINE
Payer: MEDICAID

## 2024-12-25 VITALS
DIASTOLIC BLOOD PRESSURE: 99 MMHG | RESPIRATION RATE: 18 BRPM | HEIGHT: 73 IN | TEMPERATURE: 99 F | HEART RATE: 84 BPM | BODY MASS INDEX: 30.48 KG/M2 | OXYGEN SATURATION: 95 % | WEIGHT: 230 LBS | SYSTOLIC BLOOD PRESSURE: 141 MMHG
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
## 2024-12-25 LAB
ABORH RETYPE: NORMAL
ALBUMIN SERPL-MCNC: 3.9 G/DL (ref 3.5–5)
ALBUMIN/GLOB SERPL: 1.3 RATIO (ref 1.1–2)
ALP SERPL-CCNC: 72 UNIT/L (ref 40–150)
ALT SERPL-CCNC: 22 UNIT/L (ref 0–55)
AMPHET UR QL SCN: NEGATIVE
ANION GAP SERPL CALC-SCNC: 9 MEQ/L
APTT PPP: 24.4 SECONDS (ref 23.2–33.7)
AST SERPL-CCNC: 17 UNIT/L (ref 5–34)
BACTERIA #/AREA URNS AUTO: ABNORMAL /HPF
BARBITURATE SCN PRESENT UR: NEGATIVE
BASOPHILS # BLD AUTO: 0.07 X10(3)/MCL
BASOPHILS NFR BLD AUTO: 1.5 %
BENZODIAZ UR QL SCN: NEGATIVE
BILIRUB SERPL-MCNC: 0.8 MG/DL
BILIRUB UR QL STRIP.AUTO: NEGATIVE
BUN SERPL-MCNC: 13.2 MG/DL (ref 8.9–20.6)
CALCIUM SERPL-MCNC: 8.9 MG/DL (ref 8.4–10.2)
CANNABINOIDS UR QL SCN: NEGATIVE
CHLORIDE SERPL-SCNC: 107 MMOL/L (ref 98–107)
CLARITY UR: CLEAR
CO2 SERPL-SCNC: 24 MMOL/L (ref 22–29)
COCAINE UR QL SCN: NEGATIVE
COLOR UR AUTO: COLORLESS
CREAT SERPL-MCNC: 0.99 MG/DL (ref 0.72–1.25)
CREAT/UREA NIT SERPL: 13
EOSINOPHIL # BLD AUTO: 0.33 X10(3)/MCL (ref 0–0.9)
EOSINOPHIL NFR BLD AUTO: 7.3 %
ERYTHROCYTE [DISTWIDTH] IN BLOOD BY AUTOMATED COUNT: 11.9 % (ref 11.5–17)
ETHANOL SERPL-MCNC: <10 MG/DL
FENTANYL UR QL SCN: NEGATIVE
GFR SERPLBLD CREATININE-BSD FMLA CKD-EPI: >60 ML/MIN/1.73/M2
GLOBULIN SER-MCNC: 3 GM/DL (ref 2.4–3.5)
GLUCOSE SERPL-MCNC: 96 MG/DL (ref 74–100)
GLUCOSE UR QL STRIP: NORMAL
GROUP & RH: NORMAL
HCT VFR BLD AUTO: 45.1 % (ref 42–52)
HGB BLD-MCNC: 16 G/DL (ref 14–18)
HGB UR QL STRIP: NEGATIVE
IMM GRANULOCYTES # BLD AUTO: 0.01 X10(3)/MCL (ref 0–0.04)
IMM GRANULOCYTES NFR BLD AUTO: 0.2 %
INDIRECT COOMBS: NORMAL
INR PPP: 1
KETONES UR QL STRIP: NEGATIVE
LACTATE SERPL-SCNC: 1.3 MMOL/L (ref 0.5–2.2)
LEUKOCYTE ESTERASE UR QL STRIP: NEGATIVE
LYMPHOCYTES # BLD AUTO: 1.4 X10(3)/MCL (ref 0.6–4.6)
LYMPHOCYTES NFR BLD AUTO: 31 %
MCH RBC QN AUTO: 30.8 PG (ref 27–31)
MCHC RBC AUTO-ENTMCNC: 35.5 G/DL (ref 33–36)
MCV RBC AUTO: 86.9 FL (ref 80–94)
MDMA UR QL SCN: NEGATIVE
MONOCYTES # BLD AUTO: 0.36 X10(3)/MCL (ref 0.1–1.3)
MONOCYTES NFR BLD AUTO: 8 %
NEUTROPHILS # BLD AUTO: 2.35 X10(3)/MCL (ref 2.1–9.2)
NEUTROPHILS NFR BLD AUTO: 52 %
NITRITE UR QL STRIP: NEGATIVE
NRBC BLD AUTO-RTO: 0 %
OPIATES UR QL SCN: NEGATIVE
PCP UR QL: NEGATIVE
PH UR STRIP: 7.5 [PH]
PH UR: 7.5 [PH] (ref 3–11)
PLATELET # BLD AUTO: 138 X10(3)/MCL (ref 130–400)
PLATELETS.RETICULATED NFR BLD AUTO: 1.6 % (ref 0.9–11.2)
PMV BLD AUTO: 9.4 FL (ref 7.4–10.4)
POTASSIUM SERPL-SCNC: 3.8 MMOL/L (ref 3.5–5.1)
PROT SERPL-MCNC: 6.9 GM/DL (ref 6.4–8.3)
PROT UR QL STRIP: NEGATIVE
PROTHROMBIN TIME: 13.7 SECONDS (ref 12.5–14.5)
RBC # BLD AUTO: 5.19 X10(6)/MCL
RBC #/AREA URNS AUTO: ABNORMAL /HPF
SODIUM SERPL-SCNC: 140 MMOL/L (ref 136–145)
SP GR UR STRIP.AUTO: 1.04 (ref 1–1.03)
SPECIFIC GRAVITY, URINE AUTO (.000) (OHS): 1.04 (ref 1–1.03)
SPECIMEN OUTDATE: NORMAL
SQUAMOUS #/AREA URNS LPF: ABNORMAL /HPF
UROBILINOGEN UR STRIP-ACNC: NORMAL
WBC # BLD AUTO: 4.52 X10(3)/MCL (ref 4.5–11.5)
WBC #/AREA URNS AUTO: ABNORMAL /HPF

## 2024-12-25 PROCEDURE — 80053 COMPREHEN METABOLIC PANEL: CPT | Performed by: SURGERY

## 2024-12-25 PROCEDURE — 99285 EMERGENCY DEPT VISIT HI MDM: CPT | Mod: 25

## 2024-12-25 PROCEDURE — 25500020 PHARM REV CODE 255: Performed by: EMERGENCY MEDICINE

## 2024-12-25 PROCEDURE — 83605 ASSAY OF LACTIC ACID: CPT | Performed by: SURGERY

## 2024-12-25 PROCEDURE — 85025 COMPLETE CBC W/AUTO DIFF WBC: CPT | Performed by: SURGERY

## 2024-12-25 PROCEDURE — 85730 THROMBOPLASTIN TIME PARTIAL: CPT | Performed by: SURGERY

## 2024-12-25 PROCEDURE — 82077 ASSAY SPEC XCP UR&BREATH IA: CPT | Performed by: SURGERY

## 2024-12-25 PROCEDURE — G0390 TRAUMA RESPONS W/HOSP CRITI: HCPCS | Mod: TRAUMA1

## 2024-12-25 PROCEDURE — 86901 BLOOD TYPING SEROLOGIC RH(D): CPT | Performed by: SURGERY

## 2024-12-25 PROCEDURE — 80307 DRUG TEST PRSMV CHEM ANLYZR: CPT | Performed by: SURGERY

## 2024-12-25 PROCEDURE — 85610 PROTHROMBIN TIME: CPT | Performed by: SURGERY

## 2024-12-25 PROCEDURE — 81001 URINALYSIS AUTO W/SCOPE: CPT | Performed by: SURGERY

## 2024-12-25 PROCEDURE — 99282 EMERGENCY DEPT VISIT SF MDM: CPT | Mod: FS,,, | Performed by: SURGERY

## 2024-12-25 RX ORDER — METHOCARBAMOL 500 MG/1
1000 TABLET, FILM COATED ORAL 3 TIMES DAILY
Qty: 30 TABLET | Refills: 0 | Status: SHIPPED | OUTPATIENT
Start: 2024-12-25 | End: 2024-12-26

## 2024-12-25 RX ORDER — MELOXICAM 15 MG/1
15 TABLET ORAL DAILY
Qty: 20 TABLET | Refills: 0 | Status: SHIPPED | OUTPATIENT
Start: 2024-12-25 | End: 2024-12-26

## 2024-12-25 RX ADMIN — IOHEXOL 100 ML: 350 INJECTION, SOLUTION INTRAVENOUS at 09:12
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
## 2024-12-26 RX ORDER — MELOXICAM 15 MG/1
15 TABLET ORAL DAILY
Qty: 20 TABLET | Refills: 0 | Status: SHIPPED | OUTPATIENT
Start: 2024-12-26 | End: 2025-01-16

## 2024-12-26 RX ORDER — METHOCARBAMOL 500 MG/1
1000 TABLET, FILM COATED ORAL 3 TIMES DAILY
Qty: 30 TABLET | Refills: 0 | Status: SHIPPED | OUTPATIENT
Start: 2024-12-26 | End: 2024-12-31

## 2025-01-16 RX ORDER — MELOXICAM 15 MG/1
TABLET ORAL
Qty: 20 TABLET | Refills: 0 | Status: SHIPPED | OUTPATIENT
Start: 2025-01-16

## 2025-08-22 DIAGNOSIS — R11.10 VOMITING: Primary | ICD-10-CM

## 2025-08-28 ENCOUNTER — HOSPITAL ENCOUNTER (OUTPATIENT)
Dept: RADIOLOGY | Facility: HOSPITAL | Age: 50
Discharge: HOME | End: 2025-08-28
Attending: INTERNAL MEDICINE
Payer: MEDICAID

## 2025-08-28 VITALS — BODY MASS INDEX: 26.39 KG/M2 | WEIGHT: 200 LBS

## 2025-08-28 DIAGNOSIS — R11.10 VOMITING: ICD-10-CM

## 2025-08-28 PROCEDURE — 76700 US EXAM ABDOM COMPLETE: CPT | Mod: TC

## 2025-08-28 PROCEDURE — 25000003 PHARM REV CODE 250: Performed by: INTERNAL MEDICINE

## 2025-08-28 PROCEDURE — 78226 HEPATOBILIARY SYSTEM IMAGING: CPT | Mod: TC

## 2025-08-28 PROCEDURE — A9537 TC99M MEBROFENIN: HCPCS | Performed by: INTERNAL MEDICINE

## 2025-08-28 PROCEDURE — 63600175 PHARM REV CODE 636 W HCPCS: Performed by: INTERNAL MEDICINE

## 2025-08-28 RX ORDER — KIT FOR THE PREPARATION OF TECHNETIUM TC 99M MEBROFENIN 45 MG/10ML
8.6 INJECTION, POWDER, LYOPHILIZED, FOR SOLUTION INTRAVENOUS
Status: COMPLETED | OUTPATIENT
Start: 2025-08-28 | End: 2025-08-28

## 2025-08-28 RX ADMIN — KIT FOR THE PREPARATION OF TECHNETIUM TC 99M MEBROFENIN 8.6 MILLICURIE: 45 INJECTION, POWDER, LYOPHILIZED, FOR SOLUTION INTRAVENOUS at 09:08

## 2025-08-28 RX ADMIN — SINCALIDE 1.8 MCG: 5 INJECTION, POWDER, LYOPHILIZED, FOR SOLUTION INTRAVENOUS at 10:08

## 2025-09-02 DIAGNOSIS — R11.10 UNCONTROLLABLE VOMITING: Primary | ICD-10-CM
